# Patient Record
Sex: FEMALE | Race: BLACK OR AFRICAN AMERICAN | Employment: UNEMPLOYED | URBAN - METROPOLITAN AREA
[De-identification: names, ages, dates, MRNs, and addresses within clinical notes are randomized per-mention and may not be internally consistent; named-entity substitution may affect disease eponyms.]

---

## 2018-12-19 ENCOUNTER — HOSPITAL ENCOUNTER (EMERGENCY)
Age: 38
Discharge: HOME | End: 2018-12-19
Attending: EMERGENCY MEDICINE
Payer: MEDICAID

## 2018-12-19 VITALS
HEART RATE: 106 BPM | TEMPERATURE: 98.4 F | SYSTOLIC BLOOD PRESSURE: 128 MMHG | BODY MASS INDEX: 44.42 KG/M2 | WEIGHT: 283 LBS | DIASTOLIC BLOOD PRESSURE: 85 MMHG | RESPIRATION RATE: 20 BRPM | HEIGHT: 67 IN | OXYGEN SATURATION: 97 %

## 2018-12-19 DIAGNOSIS — F32.A DEPRESSION, UNSPECIFIED DEPRESSION TYPE: Primary | ICD-10-CM

## 2018-12-19 PROCEDURE — 99284 EMERGENCY DEPT VISIT MOD MDM: CPT | Performed by: EMERGENCY MEDICINE

## 2018-12-19 PROCEDURE — 99283 EMERGENCY DEPT VISIT LOW MDM: CPT | Performed by: EMERGENCY MEDICINE
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
## 2018-12-19 NOTE — PROGRESS NOTES
Spoke with patient about [facility]. She stated before she did that she would like to call her cousin to see if she could possibly stay with her. She also needed a  to charge her phone to get numbers. DAVID provided her with a . Patient expressed concerns of getting her medicine out of the home. She declined to call police for an escort to the home to retrieve her things. After failed tries with family members to come stay with them she agreed to call [facility] for a phone screening. Patient states that she called them earlier. She stated that they had no bed available. DAVID called back and spoke with [facility]. They stated they had no record of patient doing a screening. Patient started to become more frustrated and decline to finish a new screening and proceeded to leave the ED.     DAVID discussed with MD.
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
## 2023-08-17 ENCOUNTER — APPOINTMENT (OUTPATIENT)
Dept: CT IMAGING | Age: 43
End: 2023-08-17
Payer: MEDICAID

## 2023-08-17 ENCOUNTER — HOSPITAL ENCOUNTER (INPATIENT)
Age: 43
LOS: 2 days | Discharge: HOME | End: 2023-08-19
Attending: STUDENT IN AN ORGANIZED HEALTH CARE EDUCATION/TRAINING PROGRAM | Admitting: STUDENT IN AN ORGANIZED HEALTH CARE EDUCATION/TRAINING PROGRAM
Payer: COMMERCIAL

## 2023-08-17 ENCOUNTER — HOSPITAL ENCOUNTER (EMERGENCY)
Age: 43
Discharge: ANOTHER HOSPITAL | End: 2023-08-17
Attending: STUDENT IN AN ORGANIZED HEALTH CARE EDUCATION/TRAINING PROGRAM
Payer: MEDICAID

## 2023-08-17 VITALS
HEART RATE: 76 BPM | TEMPERATURE: 98.4 F | SYSTOLIC BLOOD PRESSURE: 132 MMHG | BODY MASS INDEX: 45.99 KG/M2 | DIASTOLIC BLOOD PRESSURE: 83 MMHG | RESPIRATION RATE: 26 BRPM | HEIGHT: 67 IN | OXYGEN SATURATION: 98 % | WEIGHT: 293 LBS

## 2023-08-17 DIAGNOSIS — R20.9 DISTURBANCE OF SKIN SENSATION: Primary | ICD-10-CM

## 2023-08-17 DIAGNOSIS — R51.9 ACUTE NONINTRACTABLE HEADACHE, UNSPECIFIED HEADACHE TYPE: ICD-10-CM

## 2023-08-17 DIAGNOSIS — G43.101 MIGRAINE WITH AURA AND WITH STATUS MIGRAINOSUS, NOT INTRACTABLE: Primary | ICD-10-CM

## 2023-08-17 PROBLEM — I63.9 ISCHEMIC STROKE (HCC): Status: ACTIVE | Noted: 2023-08-17

## 2023-08-17 LAB
ALBUMIN SERPL-MCNC: 4 G/DL (ref 3.5–5)
ALBUMIN/GLOB SERPL: 1.3 (ref 0.4–1.6)
ALP SERPL-CCNC: 94 U/L (ref 45–117)
ALT SERPL-CCNC: 17 U/L (ref 13–61)
ANION GAP SERPL CALC-SCNC: 9 MMOL/L (ref 2–11)
APPEARANCE UR: ABNORMAL
AST SERPL-CCNC: 15 U/L (ref 15–37)
BACTERIA URNS QL MICRO: ABNORMAL /HPF
BASOPHILS # BLD: 0.1 K/UL (ref 0–0.2)
BASOPHILS NFR BLD: 1 % (ref 0–2)
BILIRUB SERPL-MCNC: 0.2 MG/DL (ref 0.2–1.1)
BILIRUB UR QL: NEGATIVE
BUN SERPL-MCNC: 13 MG/DL (ref 6–23)
CALCIUM SERPL-MCNC: 9.1 MG/DL (ref 8.3–10.4)
CASTS URNS QL MICRO: 0 /LPF
CHLORIDE SERPL-SCNC: 105 MMOL/L (ref 98–107)
CHP ED QC CHECK: YES
CO2 SERPL-SCNC: 27 MMOL/L (ref 21–32)
COLOR UR: ABNORMAL
CREAT SERPL-MCNC: 0.87 MG/DL (ref 0.6–1)
CRYSTALS URNS QL MICRO: 0 /LPF
DIFFERENTIAL METHOD BLD: NORMAL
EOSINOPHIL # BLD: 0.2 K/UL (ref 0–0.8)
EOSINOPHIL NFR BLD: 2 % (ref 0.5–7.8)
EPI CELLS #/AREA URNS HPF: ABNORMAL /HPF
ERYTHROCYTE [DISTWIDTH] IN BLOOD BY AUTOMATED COUNT: 12.8 % (ref 11.9–14.6)
GLOBULIN SER CALC-MCNC: 3 G/DL (ref 2.8–4.5)
GLUCOSE BLD-MCNC: 90 MG/DL
GLUCOSE SERPL-MCNC: 86 MG/DL (ref 65–100)
GLUCOSE UR STRIP.AUTO-MCNC: NEGATIVE MG/DL
HCG UR QL: NEGATIVE
HCT VFR BLD AUTO: 41.5 % (ref 35.8–46.3)
HGB BLD-MCNC: 13.7 G/DL (ref 11.7–15.4)
HGB UR QL STRIP: ABNORMAL
IMM GRANULOCYTES # BLD AUTO: 0 K/UL (ref 0–0.5)
IMM GRANULOCYTES NFR BLD AUTO: 0 % (ref 0–5)
INR PPP: 1
KETONES UR QL STRIP.AUTO: NEGATIVE MG/DL
LEUKOCYTE ESTERASE UR QL STRIP.AUTO: ABNORMAL
LYMPHOCYTES # BLD: 2.9 K/UL (ref 0.5–4.6)
LYMPHOCYTES NFR BLD: 41 % (ref 13–44)
MCH RBC QN AUTO: 28.8 PG (ref 26.1–32.9)
MCHC RBC AUTO-ENTMCNC: 33 G/DL (ref 31.4–35)
MCV RBC AUTO: 87.2 FL (ref 82–102)
MONOCYTES # BLD: 0.5 K/UL (ref 0.1–1.3)
MONOCYTES NFR BLD: 7 % (ref 4–12)
MUCOUS THREADS URNS QL MICRO: 0 /LPF
NEUTS SEG # BLD: 3.4 K/UL (ref 1.7–8.2)
NEUTS SEG NFR BLD: 48 % (ref 43–78)
NITRITE UR QL STRIP.AUTO: NEGATIVE
NRBC # BLD: 0 K/UL (ref 0–0.2)
OTHER OBSERVATIONS: ABNORMAL
PH UR STRIP: 5.5 (ref 5–9)
PLATELET # BLD AUTO: 198 K/UL (ref 150–450)
PMV BLD AUTO: 11.8 FL (ref 9.4–12.3)
POTASSIUM SERPL-SCNC: 4.4 MMOL/L (ref 3.5–5.1)
PROT SERPL-MCNC: 7 G/DL (ref 6.4–8.2)
PROT UR STRIP-MCNC: 100 MG/DL
PROTHROMBIN TIME: 13.3 SEC (ref 12.6–14.3)
RBC # BLD AUTO: 4.76 M/UL (ref 4.05–5.2)
RBC #/AREA URNS HPF: ABNORMAL /HPF
SODIUM SERPL-SCNC: 141 MMOL/L (ref 133–143)
SP GR UR REFRACTOMETRY: 1.01 (ref 1–1.02)
UROBILINOGEN UR QL STRIP.AUTO: 0.2 EU/DL (ref 0.2–1)
WBC # BLD AUTO: 7 K/UL (ref 4.3–11.1)
WBC URNS QL MICRO: ABNORMAL /HPF

## 2023-08-17 PROCEDURE — 85025 COMPLETE CBC W/AUTO DIFF WBC: CPT

## 2023-08-17 PROCEDURE — 6370000000 HC RX 637 (ALT 250 FOR IP): Performed by: STUDENT IN AN ORGANIZED HEALTH CARE EDUCATION/TRAINING PROGRAM

## 2023-08-17 PROCEDURE — 6360000004 HC RX CONTRAST MEDICATION: Performed by: STUDENT IN AN ORGANIZED HEALTH CARE EDUCATION/TRAINING PROGRAM

## 2023-08-17 PROCEDURE — 99285 EMERGENCY DEPT VISIT HI MDM: CPT

## 2023-08-17 PROCEDURE — 6360000002 HC RX W HCPCS: Performed by: STUDENT IN AN ORGANIZED HEALTH CARE EDUCATION/TRAINING PROGRAM

## 2023-08-17 PROCEDURE — 96374 THER/PROPH/DIAG INJ IV PUSH: CPT

## 2023-08-17 PROCEDURE — 81001 URINALYSIS AUTO W/SCOPE: CPT

## 2023-08-17 PROCEDURE — 70498 CT ANGIOGRAPHY NECK: CPT

## 2023-08-17 PROCEDURE — 1100000003 HC PRIVATE W/ TELEMETRY

## 2023-08-17 PROCEDURE — 85610 PROTHROMBIN TIME: CPT

## 2023-08-17 PROCEDURE — 70450 CT HEAD/BRAIN W/O DYE: CPT

## 2023-08-17 PROCEDURE — 82962 GLUCOSE BLOOD TEST: CPT

## 2023-08-17 PROCEDURE — 6370000000 HC RX 637 (ALT 250 FOR IP): Performed by: HOSPITALIST

## 2023-08-17 PROCEDURE — 80053 COMPREHEN METABOLIC PANEL: CPT

## 2023-08-17 PROCEDURE — 2580000003 HC RX 258: Performed by: STUDENT IN AN ORGANIZED HEALTH CARE EDUCATION/TRAINING PROGRAM

## 2023-08-17 PROCEDURE — 81025 URINE PREGNANCY TEST: CPT

## 2023-08-17 RX ORDER — CARBAMAZEPINE 200 MG/1
200 TABLET ORAL ONCE
Status: COMPLETED | OUTPATIENT
Start: 2023-08-17 | End: 2023-08-17

## 2023-08-17 RX ORDER — FLUTICASONE PROPIONATE 50 MCG
1 SPRAY, SUSPENSION (ML) NASAL 2 TIMES DAILY
COMMUNITY
Start: 2023-04-12

## 2023-08-17 RX ORDER — METHOCARBAMOL 500 MG/1
500 TABLET, FILM COATED ORAL 4 TIMES DAILY PRN
COMMUNITY
Start: 2023-04-12

## 2023-08-17 RX ORDER — CETIRIZINE HYDROCHLORIDE 10 MG/1
10 TABLET ORAL DAILY
COMMUNITY
Start: 2023-07-26

## 2023-08-17 RX ORDER — 0.9 % SODIUM CHLORIDE 0.9 %
100 INTRAVENOUS SOLUTION INTRAVENOUS ONCE
Status: COMPLETED | OUTPATIENT
Start: 2023-08-17 | End: 2023-08-17

## 2023-08-17 RX ORDER — POLYETHYLENE GLYCOL 3350 17 G/17G
17 POWDER, FOR SOLUTION ORAL DAILY PRN
Status: DISCONTINUED | OUTPATIENT
Start: 2023-08-17 | End: 2023-08-19 | Stop reason: HOSPADM

## 2023-08-17 RX ORDER — ESTRADIOL 1 MG/1
1 TABLET ORAL DAILY
COMMUNITY
Start: 2023-04-12

## 2023-08-17 RX ORDER — METOCLOPRAMIDE HYDROCHLORIDE 5 MG/ML
10 INJECTION INTRAMUSCULAR; INTRAVENOUS ONCE
Status: COMPLETED | OUTPATIENT
Start: 2023-08-17 | End: 2023-08-17

## 2023-08-17 RX ORDER — ACETAMINOPHEN 325 MG/1
650 TABLET ORAL
Status: COMPLETED | OUTPATIENT
Start: 2023-08-17 | End: 2023-08-17

## 2023-08-17 RX ORDER — SODIUM CHLORIDE 0.9 % (FLUSH) 0.9 %
5-40 SYRINGE (ML) INJECTION 2 TIMES DAILY
Status: DISCONTINUED | OUTPATIENT
Start: 2023-08-17 | End: 2023-08-17 | Stop reason: HOSPADM

## 2023-08-17 RX ORDER — CARBAMAZEPINE 200 MG/1
400 TABLET ORAL
Status: DISCONTINUED | OUTPATIENT
Start: 2023-08-18 | End: 2023-08-19 | Stop reason: HOSPADM

## 2023-08-17 RX ORDER — SENNA AND DOCUSATE SODIUM 50; 8.6 MG/1; MG/1
2 TABLET, FILM COATED ORAL 2 TIMES DAILY
Status: DISCONTINUED | OUTPATIENT
Start: 2023-08-18 | End: 2023-08-19 | Stop reason: HOSPADM

## 2023-08-17 RX ORDER — SODIUM CHLORIDE 0.9 % (FLUSH) 0.9 %
5-40 SYRINGE (ML) INJECTION PRN
Status: DISCONTINUED | OUTPATIENT
Start: 2023-08-17 | End: 2023-08-19 | Stop reason: HOSPADM

## 2023-08-17 RX ORDER — ASPIRIN 81 MG/1
81 TABLET, CHEWABLE ORAL DAILY
Status: DISCONTINUED | OUTPATIENT
Start: 2023-08-18 | End: 2023-08-19 | Stop reason: HOSPADM

## 2023-08-17 RX ORDER — LATANOPROST 50 UG/ML
1 SOLUTION/ DROPS OPHTHALMIC DAILY
Status: DISCONTINUED | OUTPATIENT
Start: 2023-08-17 | End: 2023-08-19 | Stop reason: HOSPADM

## 2023-08-17 RX ORDER — SODIUM CHLORIDE 9 MG/ML
INJECTION, SOLUTION INTRAVENOUS PRN
Status: DISCONTINUED | OUTPATIENT
Start: 2023-08-17 | End: 2023-08-19 | Stop reason: HOSPADM

## 2023-08-17 RX ORDER — FLUTICASONE PROPIONATE 50 MCG
1 SPRAY, SUSPENSION (ML) NASAL DAILY
Status: DISCONTINUED | OUTPATIENT
Start: 2023-08-18 | End: 2023-08-19 | Stop reason: HOSPADM

## 2023-08-17 RX ORDER — ENOXAPARIN SODIUM 100 MG/ML
30 INJECTION SUBCUTANEOUS EVERY 12 HOURS
Status: DISCONTINUED | OUTPATIENT
Start: 2023-08-17 | End: 2023-08-19 | Stop reason: HOSPADM

## 2023-08-17 RX ORDER — ONDANSETRON 4 MG/1
4 TABLET, ORALLY DISINTEGRATING ORAL EVERY 8 HOURS PRN
Status: DISCONTINUED | OUTPATIENT
Start: 2023-08-17 | End: 2023-08-19 | Stop reason: HOSPADM

## 2023-08-17 RX ORDER — LISINOPRIL 40 MG/1
40 TABLET ORAL DAILY
COMMUNITY
Start: 2023-07-26

## 2023-08-17 RX ORDER — AMOXICILLIN 250 MG
4 CAPSULE ORAL 2 TIMES DAILY
COMMUNITY
Start: 2023-04-12

## 2023-08-17 RX ORDER — ASPIRIN 300 MG/1
300 SUPPOSITORY RECTAL DAILY
Status: DISCONTINUED | OUTPATIENT
Start: 2023-08-18 | End: 2023-08-19 | Stop reason: HOSPADM

## 2023-08-17 RX ORDER — ONDANSETRON 2 MG/ML
4 INJECTION INTRAMUSCULAR; INTRAVENOUS EVERY 6 HOURS PRN
Status: DISCONTINUED | OUTPATIENT
Start: 2023-08-17 | End: 2023-08-19 | Stop reason: HOSPADM

## 2023-08-17 RX ORDER — LATANOPROST 50 UG/ML
1 SOLUTION/ DROPS OPHTHALMIC
COMMUNITY
Start: 2023-04-12

## 2023-08-17 RX ORDER — CITALOPRAM 20 MG/1
10 TABLET ORAL DAILY
Status: DISCONTINUED | OUTPATIENT
Start: 2023-08-18 | End: 2023-08-19 | Stop reason: HOSPADM

## 2023-08-17 RX ORDER — ATORVASTATIN CALCIUM 80 MG/1
80 TABLET, FILM COATED ORAL NIGHTLY
Status: DISCONTINUED | OUTPATIENT
Start: 2023-08-17 | End: 2023-08-19 | Stop reason: HOSPADM

## 2023-08-17 RX ORDER — LEVOCETIRIZINE DIHYDROCHLORIDE 5 MG/1
5 TABLET, FILM COATED ORAL DAILY
COMMUNITY
Start: 2023-07-26

## 2023-08-17 RX ORDER — NYSTATIN 100000 [USP'U]/G
POWDER TOPICAL
COMMUNITY
Start: 2023-07-26 | End: 2024-07-25

## 2023-08-17 RX ORDER — DICYCLOMINE HCL 20 MG
20 TABLET ORAL 4 TIMES DAILY PRN
Status: DISCONTINUED | OUTPATIENT
Start: 2023-08-17 | End: 2023-08-19 | Stop reason: HOSPADM

## 2023-08-17 RX ORDER — CARBAMAZEPINE 200 MG/1
TABLET ORAL
COMMUNITY
Start: 2023-08-13

## 2023-08-17 RX ORDER — CARBAMAZEPINE 200 MG/1
200 TABLET ORAL 2 TIMES DAILY
Status: DISCONTINUED | OUTPATIENT
Start: 2023-08-17 | End: 2023-08-17

## 2023-08-17 RX ORDER — DICYCLOMINE HCL 20 MG
TABLET ORAL
COMMUNITY
Start: 2023-05-17

## 2023-08-17 RX ORDER — CITALOPRAM HYDROBROMIDE 10 MG/1
10 TABLET ORAL DAILY
COMMUNITY
Start: 2023-04-12

## 2023-08-17 RX ORDER — CETIRIZINE HYDROCHLORIDE 10 MG/1
10 TABLET ORAL DAILY
Status: DISCONTINUED | OUTPATIENT
Start: 2023-08-18 | End: 2023-08-19 | Stop reason: HOSPADM

## 2023-08-17 RX ORDER — SODIUM CHLORIDE 0.9 % (FLUSH) 0.9 %
5-40 SYRINGE (ML) INJECTION EVERY 12 HOURS SCHEDULED
Status: DISCONTINUED | OUTPATIENT
Start: 2023-08-17 | End: 2023-08-19 | Stop reason: HOSPADM

## 2023-08-17 RX ADMIN — CARBAMAZEPINE 200 MG: 200 TABLET ORAL at 22:29

## 2023-08-17 RX ADMIN — LATANOPROST 1 DROP: 50 SOLUTION OPHTHALMIC at 22:34

## 2023-08-17 RX ADMIN — ATORVASTATIN CALCIUM 80 MG: 80 TABLET, FILM COATED ORAL at 21:16

## 2023-08-17 RX ADMIN — IOPAMIDOL 60 ML: 755 INJECTION, SOLUTION INTRAVENOUS at 14:38

## 2023-08-17 RX ADMIN — POLYETHYLENE GLYCOL 3350 17 G: 17 POWDER, FOR SOLUTION ORAL at 21:19

## 2023-08-17 RX ADMIN — SODIUM CHLORIDE, PRESERVATIVE FREE 10 ML: 5 INJECTION INTRAVENOUS at 14:39

## 2023-08-17 RX ADMIN — ENOXAPARIN SODIUM 30 MG: 100 INJECTION SUBCUTANEOUS at 21:19

## 2023-08-17 RX ADMIN — ACETAMINOPHEN 650 MG: 325 TABLET ORAL at 16:05

## 2023-08-17 RX ADMIN — CARBAMAZEPINE 200 MG: 200 TABLET ORAL at 21:16

## 2023-08-17 RX ADMIN — METOCLOPRAMIDE HYDROCHLORIDE 10 MG: 5 INJECTION INTRAMUSCULAR; INTRAVENOUS at 16:05

## 2023-08-17 RX ADMIN — SODIUM CHLORIDE 100 ML: 9 INJECTION, SOLUTION INTRAVENOUS at 14:38

## 2023-08-17 RX ADMIN — SODIUM CHLORIDE, PRESERVATIVE FREE 10 ML: 5 INJECTION INTRAVENOUS at 21:20

## 2023-08-17 ASSESSMENT — PAIN SCALES - GENERAL
PAINLEVEL_OUTOF10: 0
PAINLEVEL_OUTOF10: 10
PAINLEVEL_OUTOF10: 8

## 2023-08-17 ASSESSMENT — PAIN DESCRIPTION - LOCATION: LOCATION: HEAD

## 2023-08-17 ASSESSMENT — LIFESTYLE VARIABLES
HOW MANY STANDARD DRINKS CONTAINING ALCOHOL DO YOU HAVE ON A TYPICAL DAY: PATIENT DOES NOT DRINK
HOW OFTEN DO YOU HAVE A DRINK CONTAINING ALCOHOL: NEVER

## 2023-08-17 ASSESSMENT — PAIN - FUNCTIONAL ASSESSMENT: PAIN_FUNCTIONAL_ASSESSMENT: 0-10

## 2023-08-17 NOTE — H&P
Hospitalist History and Physical   Admit Date:  2023  6:09 PM   Name:  Lalita Leung   Age:  41 y.o. Sex:  female  :  1980   MRN:  [id number]   Room:  [facility]/    Presenting/Chief Complaint: No chief complaint on file. Reason(s) for Admission: Ischemic stroke ([address]) [I63.9]     History of Present Illness:     Amrit Bernstein is a 41 y.o. female with medical history of hypertension, seizure disorder, IBS who presented with right-sided tingling and numbness since this morning 9:30-10 AM.  Patient woke up this morning feeling numbness and tingling involving the right upper and lower extremity. Initially she was unable to hold a cup on the right hand but denies weakness, change in speech/vision, ataxia, vertigo, chest pain, shortness of breath, convulsions. She also reported moderate headache for the past few days and left-sided sharp nonradiating posterior neck pain started this morning. [address] [facility] ED, noncontrast head CT showed: A large, likely postinfarct chronic cystic encephalomalacia in the right cerebral hemisphere in the superior distribution of the MCA. CTA head/neck showed: Very large right sided old infarct cannot totally this may represent an arachnoid cyst, with significant narrowing of M2 segments of the right middle cerebral artery. Neurology consulted and patient transferred to [facility] for further work-up. Assessment & Plan:    Amrit Bernstein is a 41 y.o. female with medical history of hypertension, seizure disorder, IBS who presented with right-sided tingling and numbness since this morning 9:30-10 AM.     Principal Problem:    Ischemic stroke [facility]  Patient presenting with right-sided tingling and numbness, headache and left-sided posterior neck pain. CT showed: A large, likely postinfarct chronic cystic encephalomalacia in the right cerebral hemisphere in the superior distribution of the MCA.  CTA head/neck showed: Very large right sided old 3.5 - 5.0 g/dL    Globulin 3.0 2.8 - 4.5 g/dL    Albumin/Globulin Ratio 1.3 0.4 - 1.6     Protime-INR    Collection Time: 08/17/23  1:41 PM   Result Value Ref Range    Protime 13.3 12.6 - 14.3 sec    INR 1.0     Urinalysis w rflx microscopic    Collection Time: 08/17/23  2:37 PM   Result Value Ref Range    Color, UA STRAW      Appearance TURBID      Specific Gravity, UA 1.010 1.001 - 1.023      pH, Urine 5.5 5.0 - 9.0      Protein,  (A) NEG mg/dL    Glucose, UA Negative mg/dL    Ketones, Urine Negative NEG mg/dL    Bilirubin Urine Negative NEG      Blood, Urine Trace Intact (A) NEG      Urobilinogen, Urine 0.2 0.2 - 1.0 EU/dL    Nitrite, Urine Negative NEG      Leukocyte Esterase, Urine TRACE (A) NEG     Pregnancy, Urine    Collection Time: 08/17/23  2:37 PM   Result Value Ref Range    HCG(Urine) Pregnancy Test Negative     Urinalysis, Micro    Collection Time: 08/17/23  2:37 PM   Result Value Ref Range    WBC, UA 3-5 0 /hpf    RBC, UA 0-3 0 /hpf    Epithelial Cells UA 20-50 0 /hpf    BACTERIA, URINE 2+ (H) 0 /hpf    Casts 0 0 /lpf    Crystals 0 0 /LPF    Mucus, UA 0 0 /lpf    Other observations RESULTS VERIFIED MANUALLY         I have personally reviewed imaging studies:  CTA HEAD NECK W WO CONTRAST    Result Date: 8/17/2023  History: Sided tingling and numbness. History of CVA Comments: CT ANGIOGRAM OF THE NECK AND CT ANGIOGRAM OF THE [place] OF [place] was obtained following the administration of IV contrast. IV contrast was administered to evaluate the arterial vasculature. Reformatted images in the coronal and sagittal planes as well as 3-D imaging was obtained and reviewed on a dedicated PACS and [address] [place], [place] [address]Suite [address]. Radiation reduction dose techniques were used for the study. Our CT scanner use one or all of the following- Automated exposure control, adjustment of the mA and/or KV according the patient size, iterative reconstruction. All measurements are based upon NASCET criteria if appropriate.

## 2023-08-17 NOTE — ED PROVIDER NOTES
MCA  distribution. No mass effect from this. The rest of the structures in the right cerebral hemisphere. Intact. The midline and parasellar structures, and posterior fossa are otherwise intact. There is no finding of an acute cortical stroke, mass lesion, or acute  intracranial blood. No extra-axial fluid collection. The skull is intact, no significant abnormality. The visualized paranasal sinuses are patent. The visualized orbits and mastoid air-cells are not remarkable. Impression    THERE IS A LARGE LIKELY POST INFARCT CHRONIC CYSTIC ENCEPHALOMALACIA FOCUS IN  THE RIGHT CEREBRAL HEMISPHERE IN THE SUPERIOR DISTRIBUTION OF THE MCA. DETAILS  AS ABOVE. OTHERWISE NO ACUTE INTRACRANIAL ABNORMALITY IDENTIFIED. CTA HEAD NECK W WO CONTRAST    Narrative    History: Sided tingling and numbness. History of CVA    Comments: CT ANGIOGRAM OF THE NECK AND CT ANGIOGRAM OF THE [place] OF [place] was  obtained following the administration of IV contrast. IV contrast was  administered to evaluate the arterial vasculature. Reformatted images in the  coronal and sagittal planes as well as 3-D imaging was obtained and reviewed on  a dedicated PACS and [facility],[address]. Radiation reduction dose techniques  were used for the study. Our CT scanner use one or all of the following-  Automated exposure control, adjustment of the mA and/or KV according the patient  size, iterative reconstruction. All measurements are based upon NASCET criteria  if appropriate. Findings:    CT ANGIOGRAM OF THE NECK: The arch and proximal great vessels are patent. The  carotid bulbs are patent without stenosis. The proximal vertebral arteries are  patent. Lung apices are clear. Thyroid gland is unremarkable    CT angiogram of [place] of [place]:    The petrous, cavernous, and supraclinoid internal carotid arteries are patent.   The middle cerebral arteries are patent however a proximal M2 segment on the  left demonstrates significant narrowing and there is sequelae of a large  right-sided infarct. The distal vertebral arteries, posterior inferior cerebellar arteries basilar  artery and posterior cerebral arteries are patent. The dural venous sinuses are patent. Impression    1. Very large right sided old infarct cannot totally this may represent an  arachnoid cyst, with significant narrowing of M2 segments of the right middle  cerebral artery.        CBC with Auto Differential   Result Value Ref Range    WBC 7.0 4.3 - 11.1 K/uL    RBC 4.76 4.05 - 5.20 M/uL    Hemoglobin 13.7 11.7 - 15.4 g/dL    Hematocrit 41.5 35.8 - 46.3 %    MCV 87.2 82.0 - 102.0 FL    MCH 28.8 26.1 - 32.9 PG    MCHC 33.0 31.4 - 35.0 g/dL    RDW 12.8 11.9 - 14.6 %    Platelets 353 947 - 125 K/uL    MPV 11.8 9.4 - 12.3 FL    nRBC 0.00 0.0 - 0.2 K/uL    Differential Type AUTOMATED      Neutrophils % 48 43 - 78 %    Lymphocytes % 41 13 - 44 %    Monocytes % 7 4.0 - 12.0 %    Eosinophils % 2 0.5 - 7.8 %    Basophils % 1 0.0 - 2.0 %    Immature Granulocytes 0 0.0 - 5.0 %    Neutrophils Absolute 3.4 1.7 - 8.2 K/UL    Lymphocytes Absolute 2.9 0.5 - 4.6 K/UL    Monocytes Absolute 0.5 0.1 - 1.3 K/UL    Eosinophils Absolute 0.2 0.0 - 0.8 K/UL    Basophils Absolute 0.1 0.0 - 0.2 K/UL    Absolute Immature Granulocyte 0.0 0.0 - 0.5 K/UL   CMP   Result Value Ref Range    Sodium 141 133 - 143 mmol/L    Potassium 4.4 3.5 - 5.1 mmol/L    Chloride 105 98 - 107 mmol/L    CO2 27 21 - 32 mmol/L    Anion Gap 9 2 - 11 mmol/L    Glucose 86 65 - 100 mg/dL    BUN 13 6 - 23 MG/DL    Creatinine 0.87 0.6 - 1.0 MG/DL    Est, Glom Filt Rate >60 >60 ml/min/1.73m2    Calcium 9.1 8.3 - 10.4 MG/DL    Total Bilirubin 0.2 0.2 - 1.1 MG/DL    ALT 17 13.0 - 61.0 U/L    AST 15 15 - 37 U/L    Alk Phosphatase 94 45.0 - 117.0 U/L    Total Protein 7.0 6.4 - 8.2 g/dL    Albumin 4.0 3.5 - 5.0 g/dL    Globulin 3.0 2.8 - 4.5 g/dL    Albumin/Globulin Ratio 1.3 0.4 - 1.6     Protime-INR   Result Value Ref

## 2023-08-17 NOTE — ED TRIAGE NOTES
Pt arrived by EMS from home. Pt c/o headache and constipation x4 days, also right arm tingling started this morning. Hx stroke, states it happened when she was younger - does have some left sided deficits.      VSS  150/100  90bpm  100% RA  BGL 90

## 2023-08-17 NOTE — ED NOTES
TRANSFER - OUT REPORT:    Verbal report given to [place], RN on Christina Chi  being transferred to [facility] [phone] for routine progression of patient care       Report consisted of patient's Situation, Background, Assessment and   Recommendations(SBAR). Information from the following report(s) Nurse Handoff Report, ED Encounter Summary, MAR, Cardiac Rhythm NSR, and Neuro Assessment was reviewed with the receiving nurse. Lines:   Peripheral IV 08/17/23 Right Antecubital (Active)   Site Assessment Clean, dry & intact 08/17/23 1332   Line Status Blood return noted 08/17/23 1332   Phlebitis Assessment No symptoms 08/17/23 1332   Infiltration Assessment 0 08/17/23 1332   Dressing Type Transparent 08/17/23 1332   Dressing Intervention New 08/17/23 1332        Opportunity for questions and clarification was provided.       Patient transported with:  Monitor       Raquel Harkins RN  08/17/23 0808

## 2023-08-18 ENCOUNTER — HOSPITAL ENCOUNTER (INPATIENT)
Age: 43
End: 2023-08-18
Payer: MEDICAID

## 2023-08-18 LAB
CHOLEST SERPL-MCNC: 182 MG/DL
ERYTHROCYTE [DISTWIDTH] IN BLOOD BY AUTOMATED COUNT: 13.1 % (ref 11.9–14.6)
EST. AVERAGE GLUCOSE BLD GHB EST-MCNC: 88 MG/DL
GLUCOSE BLD STRIP.AUTO-MCNC: 90 MG/DL (ref 65–100)
HBA1C MFR BLD: 4.7 % (ref 4.8–5.6)
HCT VFR BLD AUTO: 39.5 % (ref 35.8–46.3)
HDLC SERPL-MCNC: 53 MG/DL (ref 40–60)
HDLC SERPL: 3.4
HGB BLD-MCNC: 12.8 G/DL (ref 11.7–15.4)
LDLC SERPL CALC-MCNC: 92 MG/DL
MCH RBC QN AUTO: 28.8 PG (ref 26.1–32.9)
MCHC RBC AUTO-ENTMCNC: 32.4 G/DL (ref 31.4–35)
MCV RBC AUTO: 88.8 FL (ref 82–102)
NRBC # BLD: 0 K/UL (ref 0–0.2)
PLATELET # BLD AUTO: 190 K/UL (ref 150–450)
PMV BLD AUTO: 12 FL (ref 9.4–12.3)
RBC # BLD AUTO: 4.45 M/UL (ref 4.05–5.2)
SERVICE CMNT-IMP: NORMAL
TRIGL SERPL-MCNC: 185 MG/DL (ref 35–150)
VLDLC SERPL CALC-MCNC: 37 MG/DL (ref 6–23)
WBC # BLD AUTO: 7.3 K/UL (ref 4.3–11.1)

## 2023-08-18 PROCEDURE — 80061 LIPID PANEL: CPT

## 2023-08-18 PROCEDURE — 6370000000 HC RX 637 (ALT 250 FOR IP): Performed by: HOSPITALIST

## 2023-08-18 PROCEDURE — 2580000003 HC RX 258: Performed by: STUDENT IN AN ORGANIZED HEALTH CARE EDUCATION/TRAINING PROGRAM

## 2023-08-18 PROCEDURE — 97166 OT EVAL MOD COMPLEX 45 MIN: CPT

## 2023-08-18 PROCEDURE — 2580000003 HC RX 258: Performed by: FAMILY MEDICINE

## 2023-08-18 PROCEDURE — A9579 GAD-BASE MR CONTRAST NOS,1ML: HCPCS | Performed by: FAMILY MEDICINE

## 2023-08-18 PROCEDURE — 36415 COLL VENOUS BLD VENIPUNCTURE: CPT

## 2023-08-18 PROCEDURE — 92610 EVALUATE SWALLOWING FUNCTION: CPT

## 2023-08-18 PROCEDURE — 1100000003 HC PRIVATE W/ TELEMETRY

## 2023-08-18 PROCEDURE — 6360000002 HC RX W HCPCS: Performed by: STUDENT IN AN ORGANIZED HEALTH CARE EDUCATION/TRAINING PROGRAM

## 2023-08-18 PROCEDURE — 99221 1ST HOSP IP/OBS SF/LOW 40: CPT | Performed by: PSYCHIATRY & NEUROLOGY

## 2023-08-18 PROCEDURE — 97530 THERAPEUTIC ACTIVITIES: CPT

## 2023-08-18 PROCEDURE — 6370000000 HC RX 637 (ALT 250 FOR IP): Performed by: INTERNAL MEDICINE

## 2023-08-18 PROCEDURE — 6370000000 HC RX 637 (ALT 250 FOR IP): Performed by: STUDENT IN AN ORGANIZED HEALTH CARE EDUCATION/TRAINING PROGRAM

## 2023-08-18 PROCEDURE — 97112 NEUROMUSCULAR REEDUCATION: CPT

## 2023-08-18 PROCEDURE — 70553 MRI BRAIN STEM W/O & W/DYE: CPT

## 2023-08-18 PROCEDURE — 97162 PT EVAL MOD COMPLEX 30 MIN: CPT

## 2023-08-18 PROCEDURE — 85027 COMPLETE CBC AUTOMATED: CPT

## 2023-08-18 PROCEDURE — 6360000004 HC RX CONTRAST MEDICATION: Performed by: FAMILY MEDICINE

## 2023-08-18 PROCEDURE — 83036 HEMOGLOBIN GLYCOSYLATED A1C: CPT

## 2023-08-18 RX ORDER — METOCLOPRAMIDE HYDROCHLORIDE 5 MG/ML
10 INJECTION INTRAMUSCULAR; INTRAVENOUS ONCE
Status: DISCONTINUED | OUTPATIENT
Start: 2023-08-18 | End: 2023-08-19 | Stop reason: HOSPADM

## 2023-08-18 RX ORDER — DIPHENHYDRAMINE HYDROCHLORIDE 50 MG/ML
25 INJECTION INTRAMUSCULAR; INTRAVENOUS ONCE
Status: DISCONTINUED | OUTPATIENT
Start: 2023-08-18 | End: 2023-08-19 | Stop reason: HOSPADM

## 2023-08-18 RX ORDER — SODIUM CHLORIDE 0.9 % (FLUSH) 0.9 %
10 SYRINGE (ML) INJECTION AS NEEDED
Status: DISCONTINUED | OUTPATIENT
Start: 2023-08-18 | End: 2023-08-21 | Stop reason: HOSPADM

## 2023-08-18 RX ORDER — LORAZEPAM 0.5 MG/1
1 TABLET ORAL ONCE
Status: COMPLETED | OUTPATIENT
Start: 2023-08-18 | End: 2023-08-18

## 2023-08-18 RX ORDER — ACETAMINOPHEN 500 MG
1000 TABLET ORAL ONCE
Status: DISCONTINUED | OUTPATIENT
Start: 2023-08-18 | End: 2023-08-19 | Stop reason: HOSPADM

## 2023-08-18 RX ORDER — KETOROLAC TROMETHAMINE 30 MG/ML
30 INJECTION, SOLUTION INTRAMUSCULAR; INTRAVENOUS ONCE
Status: COMPLETED | OUTPATIENT
Start: 2023-08-18 | End: 2023-08-18

## 2023-08-18 RX ADMIN — CETIRIZINE HYDROCHLORIDE 10 MG: 10 TABLET, FILM COATED ORAL at 08:27

## 2023-08-18 RX ADMIN — KETOROLAC TROMETHAMINE 30 MG: 30 INJECTION, SOLUTION INTRAMUSCULAR at 00:36

## 2023-08-18 RX ADMIN — DOCUSATE SODIUM 50 MG AND SENNOSIDES 8.6 MG 2 TABLET: 8.6; 5 TABLET, FILM COATED ORAL at 20:19

## 2023-08-18 RX ADMIN — LATANOPROST 1 DROP: 50 SOLUTION OPHTHALMIC at 08:28

## 2023-08-18 RX ADMIN — SODIUM CHLORIDE, PRESERVATIVE FREE 10 ML: 5 INJECTION INTRAVENOUS at 08:28

## 2023-08-18 RX ADMIN — DOCUSATE SODIUM 50 MG AND SENNOSIDES 8.6 MG 2 TABLET: 8.6; 5 TABLET, FILM COATED ORAL at 08:27

## 2023-08-18 RX ADMIN — ATORVASTATIN CALCIUM 80 MG: 80 TABLET, FILM COATED ORAL at 20:19

## 2023-08-18 RX ADMIN — ENOXAPARIN SODIUM 30 MG: 100 INJECTION SUBCUTANEOUS at 20:19

## 2023-08-18 RX ADMIN — FLUTICASONE PROPIONATE 1 SPRAY: 50 SPRAY, METERED NASAL at 08:28

## 2023-08-18 RX ADMIN — GADOTERIDOL 28 ML: 279.3 INJECTION, SOLUTION INTRAVENOUS at 18:42

## 2023-08-18 RX ADMIN — CARBAMAZEPINE 400 MG: 200 TABLET ORAL at 20:20

## 2023-08-18 RX ADMIN — ENOXAPARIN SODIUM 30 MG: 100 INJECTION SUBCUTANEOUS at 08:28

## 2023-08-18 RX ADMIN — CITALOPRAM HYDROBROMIDE 10 MG: 20 TABLET ORAL at 08:27

## 2023-08-18 RX ADMIN — SODIUM CHLORIDE, PRESERVATIVE FREE 10 ML: 5 INJECTION INTRAVENOUS at 18:42

## 2023-08-18 RX ADMIN — LORAZEPAM 1 MG: 0.5 TABLET ORAL at 16:32

## 2023-08-18 RX ADMIN — SODIUM CHLORIDE, PRESERVATIVE FREE 5 ML: 5 INJECTION INTRAVENOUS at 20:22

## 2023-08-18 RX ADMIN — ASPIRIN 81 MG 81 MG: 81 TABLET ORAL at 19:43

## 2023-08-18 ASSESSMENT — PAIN SCALES - GENERAL: PAINLEVEL_OUTOF10: 0

## 2023-08-18 NOTE — PROGRESS NOTES
ACUTE PHYSICAL THERAPY GOALS:   (Developed with and agreed upon by patient and/or caregiver.)  Pt will perform supine to/from sit with mod I in 7 treatment days. Pt will perform sit to/from stand with mod I and LRAD in 7 treatment days. Pt will ambulate 150 ft with mod I and LRAD in 7 treatment days. Pt will negotiate 2 stairs with mod I and no rail in 7 treatment days. Pt will be independent with HEP in 7 days. PHYSICAL THERAPY Initial Assessment, Daily Note, and AM  (Link to Caseload Tracking: PT Visit Days : 1  Acknowledge Orders  Time In/Out  PT Charge Capture  Rehab Caseload Tracker    Doreen Baker is a 41 y.o. female   PRIMARY DIAGNOSIS: Ischemic stroke ([address])  Ischemic stroke ([address]) [I63.9]       Reason for Referral: Generalized Muscle Weakness (M62.81)  Other lack of cordination (R27.8)  Difficulty in walking, Not elsewhere classified (R26.2)  Unspecified Lack of Coordination (R27.9)  Inpatient: Payor: [employer] [place] / Plan: [employer] HEALTH PLAN PRIMARY CHOICE / Product Type: *No Product type* /     ASSESSMENT:     REHAB RECOMMENDATIONS:   Recommendation to date pending progress:  Setting:  Inpatient Rehab Facility    Equipment:    To Be Determined     ASSESSMENT:  Ms. Charlette Vargas is a 41 y.o. female with hx of L sided CVA admitted with R sided parasthesia and weakness. Upon PT evaluation, pt exhibits RLE/RUE weakness/diminished sensation, impaired balance, and difficulty ambulating due to RLE buckling. Pt also reporting neck pain with reproduction of R sided tingling with Spurling's Compression Test.  At baseline, pt is independent with all mobility and receives assist for some ADLs from her boyfriend. Pt is now requiring SBA for transfers and min A to ambulate due to LOB/buckling. Pt is highly motivated to return to independent PLOF and will make an excellent IRC candidate.   Pt will continue to benefit from skilled PT to address above impairments and maximize functional independence prior

## 2023-08-18 NOTE — THERAPY EVALUATION
ACUTE OCCUPATIONAL THERAPY GOALS:   (Developed with and agreed upon by patient and/or caregiver.)  1. Patient will complete lower body bathing and dressing with STAND BY ASSIST and adaptive equipment as needed. 2. Patient will complete toileting with STAND BY ASSIST. 3. Patient will complete grooming ADL standing at sink with SUPERVISION. 4. Patient will tolerate 25 minutes of OT treatment with 1-2 rest breaks to increase activity tolerance for ADLs. 5. Patient will complete functional transfers with MODIFIED INDEPENDENCE and adaptive equipment as needed. 6. Patient will tolerate 10 minutes BUE exercises to increase strength for safe, functional transfers. Timeframe: 7 visits       OCCUPATIONAL THERAPY Initial Assessment, Daily Note, and AM       OT Visit Days: 1  Acknowledge Orders  Time  OT Charge Capture  Rehab Caseload Tracker      Christina Chi is a 41 y.o. female   PRIMARY DIAGNOSIS: Ischemic stroke ([address])  Ischemic stroke ([address]) [I63.9]       Reason for Referral: Generalized Muscle Weakness (M62.81)  Other lack of cordination (R27.8)  Difficulty in walking, Not elsewhere classified (R26.2)  Inpatient: Payor: [employer] [place] / Plan: [employer] HEALTH PLAN PRIMARY CHOICE / Product Type: *No Product type* /     ASSESSMENT:     REHAB RECOMMENDATIONS:   Recommendation to date pending progress:  Setting:  Inpatient Rehab Facility     Equipment:    To Be Determined     ASSESSMENT:  Ms. Drea Wolf is a 40 y/o female presents with R-sided weakness and sensation impairments, has a history of CVA in childhood with L-sided deficits, longstanding LUE motor impairment. At baseline pt reports being independent in most ADL/ADL mobility but requiring assist from supportive boyfriend for bathing d/t body habitus and LUE motor impairment. Today, pt demonstrates impairments in sensation, strength, balance, ADL performance and functional mobility.  During functional mobility using RW, pt fatigued quickly and training, standing tolerance activity , and sitting balance activity   with minimal assistance, verbal cues, tactile cues, visual cues, and education to improve sitting balance, standing balance, posture, coordination, static balance, and dynamic balance in order to prepare for functional task, prepare for seated ADLs, prepare for standing ADLs, prepare for functional transfer, and increase safety awareness.      TREATMENT GRID:  N/A    AFTER TREATMENT PRECAUTIONS: Bed/Chair Locked, Call light within reach, Chair, Heels floated, Needs within reach, and RN notified    INTERDISCIPLINARY COLLABORATION:  RN/ PCT, PT/ PTA, and OT/ VOGEL    EDUCATION:  Education Given To: Patient  Education Provided: Role of Therapy;Plan of Care  Education Outcome: Verbalized understanding    TOTAL TREATMENT DURATION AND TIME:  Time In: 1009  Time Out: 0317 [id number]  Minutes: [address], OT

## 2023-08-18 NOTE — PROGRESS NOTES
SPEECH LANGUAGE PATHOLOGY: DYSPHAGIA  Initial Assessment    NAME: La Christensen  : 1980  MRN: [id number]    ADMISSION DATE: 2023  PRIMARY DIAGNOSIS: Ischemic stroke ([address])  Ischemic stroke ([address]) [I63.9]    ICD-10: Treatment Diagnosis: R13.19 Other Dysphagia    RECOMMENDATIONS   Diet:  Diet Solids Recommendation: Regular  Liquid Consistency Recommendation: Thin    Medications: PO             Referrals: n/a   Patient continues to require skilled intervention: no dysphagia needs. Possible higher level cognitive linguistic evaluation pending MRI findings. ASSESSMENT    Dysphagia Diagnosis: Swallow function appears WFL    Recommend regular diet and thin liquids. No dysphagia treatment indicated. GENERAL    History of Present Injury/Illness: Ms. Leighann Pino  has no past medical history on file. . She also  has no past surgical history on file. Subjective: awake in bed, conversant and pleasant. \"I hope i'm home by Saturday\"      Communication Observation: Functional (good recall of events)  Follows Directions: Complex    Prior Dysphagia History: none   Prior instrumental assessment: n/a    Current Diet : Regular  Current Liquid Diet : Thin  Patient Complaint: right arm/leg numbness. Pain:   Patient does not c/o pain                                          OBJECTIVE        Oral Motor   Labial:  (slightly reduced retraction on left.)  Oral Hygiene: Clean;Moist  Lingual: No impairment  Dentition: Adequate        Baseline Vocal Quality: Normal    Oropharyngeal Phase:      Patient self fed thin liquid via cup and straw, mixed, and solid consistencies. Appropriate oral prep and clearance with all textures. Appeared to demonstrate timely swallow and single swallows upon palpation likely indicating adequate pharyngeal clearance. No overt signs or symptoms of airway compromise observed with liquid or solid textures. Voice remained clear.                      Dysphagia Outcome and

## 2023-08-18 NOTE — CARE COORDINATION
Chart reviewed by  for potential discharge needs or concerns. Therapy evals complete with the recommendation for [place]patient Rehab at [place]. CM met with pt to complete CM assessment and discuss this recommendation. Insurance and PCP confirmed. Pt is insured with pharmacy benefits and medications are affordable. She is established with a PCP and saw them within the last month. Pt declined rehab at [place].  offered [facility] services (RN/PT/OT) and pt declined these as well. Pt verbalized understanding that should she change her mind after dc that she can contact her PCP for a referral.  No other dc needs or concerns identified or reported at present. Please notify/consult  if discharge needs arise. 08/18/23 1944   Service Assessment   Patient Orientation Alert and Oriented   Cognition Alert   History Provided By Patient;Medical Record   Primary Caregiver Spouse   Support Systems Spouse/Significant Other   Patient's Healthcare Decision Maker is: Legal Next of Kin   PCP Verified by CM Yes  ([facility])   Last Visit to PCP Within last 3 months   Prior Functional Level Assistance with the following:;Bathing;Dressing   Current Functional Level Assistance with the following:;Bathing;Dressing   Can patient return to prior living arrangement Yes   Ability to make needs known: Good   Family able to assist with home care needs: Yes   Would you like for me to discuss the discharge plan with any other family members/significant others, and if so, who? No   Financial Resources  Resources None   Social/Functional History   Lives With Significant other   Type of [facility] [address] Two level; Able to Live on Main level with bedroom/bathroom   Home Access Stairs to enter without rails   Entrance Stairs - Number of Steps 2   Receives Help From Family   ADL Assistance Needs assistance   Homemaking Assistance Needs assistance   Ambulation Assistance Independent

## 2023-08-18 NOTE — PROGRESS NOTES
4.05 - 5.2 M/uL    Hemoglobin 12.8 11.7 - 15.4 g/dL    Hematocrit 39.5 35.8 - 46.3 %    MCV 88.8 82 - 102 FL    MCH 28.8 26.1 - 32.9 PG    MCHC 32.4 31.4 - 35.0 g/dL    RDW 13.1 11.9 - 14.6 %    Platelets 772 224 - 098 K/uL    MPV 12.0 9.4 - 12.3 FL    nRBC 0.00 0.0 - 0.2 K/uL   Hemoglobin A1c    Collection Time: 08/18/23  4:32 AM   Result Value Ref Range    Hemoglobin A1C 4.7 (L) 4.8 - 5.6 %    eAG 88 mg/dL   Lipid Panel    Collection Time: 08/18/23  4:32 AM   Result Value Ref Range    Cholesterol, Total 182 <200 MG/DL    Triglycerides 185 (H) 35 - 150 MG/DL    HDL 53 40 - 60 MG/DL    LDL Calculated 92 <100 MG/DL    VLDL Cholesterol Calculated 37 (H) 6.0 - 23.0 MG/DL    Chol/HDL Ratio 3.4         Current Meds:  Current Facility-Administered Medications   Medication Dose Route Frequency    cetirizine (ZYRTEC) tablet 10 mg  10 mg Oral Daily    citalopram (CELEXA) tablet 10 mg  10 mg Oral Daily    dicyclomine (BENTYL) tablet 20 mg  20 mg Oral 4x Daily PRN    latanoprost (XALATAN) 0.005 % ophthalmic solution 1 drop  1 drop Both Eyes Daily    sodium chloride flush 0.9 % injection 5-40 mL  5-40 mL IntraVENous 2 times per day    sodium chloride flush 0.9 % injection 5-40 mL  5-40 mL IntraVENous PRN    0.9 % sodium chloride infusion   IntraVENous PRN    ondansetron (ZOFRAN-ODT) disintegrating tablet 4 mg  4 mg Oral Q8H PRN    Or    ondansetron (ZOFRAN) injection 4 mg  4 mg IntraVENous Q6H PRN    polyethylene glycol (GLYCOLAX) packet 17 g  17 g Oral Daily PRN    enoxaparin Sodium (LOVENOX) injection 30 mg  30 mg SubCUTAneous Q12H    atorvastatin (LIPITOR) tablet 80 mg  80 mg Oral Nightly    aspirin chewable tablet 81 mg  81 mg Oral Daily    Or    aspirin suppository 300 mg  300 mg Rectal Daily    fluticasone (FLONASE) 50 MCG/ACT nasal spray 1 spray  1 spray Each Nostril Daily    sennosides-docusate sodium (SENOKOT-S) 8.6-50 MG tablet 2 tablet  2 tablet Oral BID    carBAMazepine (TEGRETOL) tablet 400 mg  400 mg Oral QHS Signed:  Leyla Moore MD    Part of this note may have been written by using a voice dictation software. The note has been proof read but may still contain some grammatical/other typographical errors.

## 2023-08-18 NOTE — CONSULTS
Consult    Patient: Jamil Crump MRN: [id number]     YOB: 1980  Age: 41 y.o. Sex: female      Subjective:      Jamil Crump is a 41 y.o. female who is being seen for right-sided sensory abnormalities. The patient states that she had a stroke at 7 months of age which affected her right hemisphere. The patient has been having migraine headaches which are associated with right-sided paresthesias.       Past medical history: As above    Past surgical history: None    Family history: Reviewed and not pertinent          Social History     Tobacco Use    Smoking status: Some Days     Packs/day: 0.50     Types: Cigarettes     Start date: 1/1/1998    Smokeless tobacco: Never   Substance Use Topics    Alcohol use: Not Currently      Current Facility-Administered Medications   Medication Dose Route Frequency Provider Last Rate Last Admin    cetirizine (ZYRTEC) tablet 10 mg  10 mg Oral Daily Charles Chan MD   10 mg at 08/18/23 0827    citalopram (CELEXA) tablet 10 mg  10 mg Oral Daily Charles Chan MD   10 mg at 08/18/23 0827    dicyclomine (BENTYL) tablet 20 mg  20 mg Oral 4x Daily PRN Charles Chan MD        latanoprost (XALATAN) 0.005 % ophthalmic solution 1 drop  1 drop Both Eyes Daily Charles Chan MD   1 drop at 08/18/23 0828    sodium chloride flush 0.9 % injection 5-40 mL  5-40 mL IntraVENous 2 times per day Charles Chan MD   10 mL at 08/18/23 0828    sodium chloride flush 0.9 % injection 5-40 mL  5-40 mL IntraVENous PRN Charles Chan MD        0.9 % sodium chloride infusion   IntraVENous PRN Charles Chan MD        ondansetron (ZOFRAN-ODT) disintegrating tablet 4 mg  4 mg Oral Q8H PRN Charles Chan MD        Or    ondansetron (ZOFRAN) injection 4 mg  4 mg IntraVENous Q6H PRN Charles Chan MD        polyethylene glycol (GLYCOLAX) packet 17 g  17 g Oral Daily PRN Charles Chan MD   17 g at 08/17/23 2119    enoxaparin Sodium (LOVENOX) injection 30 mg  30 mg found for: HBA1C, IHV4XQRS     CT Results (most recent): Personally Reviewed   Results for orders placed during the hospital encounter of 08/17/23    CT HEAD WO CONTRAST    Narrative  CT BRAIN WITHOUT CONTRAST  8/17/2023 3:18 PM    INDICATION: Right arm, tingling and numbness    COMPARISON: 1/9/2008 CT brain    TECHNIQUE:  Multiple contiguous axial images are obtained encompassing the brain  from the skull base to the vertex. For this CT scanner at least one of the  following techniques is utilized to decrease patient radiation exposure:  Automatic exposure control, modulation of MA and KVP based on patient weight,  and iterative reconstruction. FINDINGS: Ventricles are midline. There is ex vacuo dilatation of the right  lateral ventricle. There is a large region of cystic encephalomalacia change in the right cerebral  hemisphere involving probably most of the superior aspect of the MCA  distribution. No mass effect from this. The rest of the structures in the right cerebral hemisphere. Intact. The midline and parasellar structures, and posterior fossa are otherwise intact. There is no finding of an acute cortical stroke, mass lesion, or acute  intracranial blood. No extra-axial fluid collection. The skull is intact, no significant abnormality. The visualized paranasal sinuses are patent. The visualized orbits and mastoid air-cells are not remarkable. Impression  THERE IS A LARGE LIKELY POST INFARCT CHRONIC CYSTIC ENCEPHALOMALACIA FOCUS IN  THE RIGHT CEREBRAL HEMISPHERE IN THE SUPERIOR DISTRIBUTION OF THE MCA. DETAILS  AS ABOVE. OTHERWISE NO ACUTE INTRACRANIAL ABNORMALITY IDENTIFIED. Most recent CTA Personally Reviewed  Results for orders placed during the hospital encounter of 08/17/23    CTA HEAD NECK W WO CONTRAST    Narrative  History: Sided tingling and numbness.  History of CVA    Comments: CT ANGIOGRAM OF THE NECK AND CT ANGIOGRAM OF THE [place] [facility] was  obtained

## 2023-08-19 VITALS
BODY MASS INDEX: 45.99 KG/M2 | TEMPERATURE: 98.6 F | RESPIRATION RATE: 18 BRPM | DIASTOLIC BLOOD PRESSURE: 99 MMHG | SYSTOLIC BLOOD PRESSURE: 132 MMHG | HEIGHT: 67 IN | WEIGHT: 293 LBS | OXYGEN SATURATION: 96 % | HEART RATE: 93 BPM

## 2023-08-19 PROBLEM — G43.909 MIGRAINE: Status: ACTIVE | Noted: 2023-08-19

## 2023-08-19 PROCEDURE — 6370000000 HC RX 637 (ALT 250 FOR IP): Performed by: STUDENT IN AN ORGANIZED HEALTH CARE EDUCATION/TRAINING PROGRAM

## 2023-08-19 PROCEDURE — 2580000003 HC RX 258: Performed by: STUDENT IN AN ORGANIZED HEALTH CARE EDUCATION/TRAINING PROGRAM

## 2023-08-19 PROCEDURE — 99231 SBSQ HOSP IP/OBS SF/LOW 25: CPT | Performed by: PSYCHIATRY & NEUROLOGY

## 2023-08-19 PROCEDURE — 6370000000 HC RX 637 (ALT 250 FOR IP): Performed by: HOSPITALIST

## 2023-08-19 PROCEDURE — 6360000002 HC RX W HCPCS: Performed by: STUDENT IN AN ORGANIZED HEALTH CARE EDUCATION/TRAINING PROGRAM

## 2023-08-19 RX ADMIN — CITALOPRAM HYDROBROMIDE 10 MG: 20 TABLET ORAL at 10:28

## 2023-08-19 RX ADMIN — DOCUSATE SODIUM 50 MG AND SENNOSIDES 8.6 MG 2 TABLET: 8.6; 5 TABLET, FILM COATED ORAL at 10:28

## 2023-08-19 RX ADMIN — SODIUM CHLORIDE, PRESERVATIVE FREE 10 ML: 5 INJECTION INTRAVENOUS at 10:30

## 2023-08-19 RX ADMIN — ENOXAPARIN SODIUM 30 MG: 100 INJECTION SUBCUTANEOUS at 10:28

## 2023-08-19 RX ADMIN — ASPIRIN 81 MG 81 MG: 81 TABLET ORAL at 10:28

## 2023-08-19 RX ADMIN — CETIRIZINE HYDROCHLORIDE 10 MG: 10 TABLET, FILM COATED ORAL at 10:28

## 2023-08-19 ASSESSMENT — PAIN SCALES - GENERAL: PAINLEVEL_OUTOF10: 0

## 2023-08-19 NOTE — PROGRESS NOTES
Neurology Progress Note       Interval History: The patient is doing well today[facility] and she feels great today. She is ready to go home      Examination: Pertinent positives and negatives include:    No focal neurological deficits on examination other than chronic abnormalities such as left upper extremity weakness. I personally reviewed the patient's MRI which shows large areas of cystic encephalomalacia appearing similar to arachnoid cysts. No acute intracranial pathology      Assessment and Plan: 72-year-old with focal deficits secondary to migraine. She is normal after Nurtec    Recommend following up with Klaudia Mueller. We can give the patient a sample of Nurtec at that time. Triptans are contraindicated given her history of stroke. Addendum: If the patient was not on a statin and aspirin prior to hospitalization then this is probably not necessary. Her abnormalities are from infancy. Cumulative time spent today was 25 minutes which included chart review, examining the patient, obtaining history from patient/family/other providers, reviewing imaging, and counseling the patient and/or family on medical condition.

## 2023-08-19 NOTE — PLAN OF CARE
Problem: Discharge Planning  Goal: Discharge to home or other facility with appropriate resources  Outcome: Progressing  Flowsheets (Taken 8/18/2023 2000)  Discharge to home or other facility with appropriate resources: Identify barriers to discharge with patient and caregiver     Problem: ABCDS Injury Assessment  Goal: Absence of physical injury  Outcome: Progressing  Flowsheets (Taken 8/18/2023 1942)  Absence of Physical Injury: Implement safety measures based on patient assessment     Problem: Safety - Adult  Goal: Free from fall injury  Outcome: Progressing  Flowsheets (Taken 8/18/2023 1942)  Free From Fall Injury:   Instruct family/caregiver on patient safety   Based on caregiver fall risk screen, instruct family/caregiver to ask for assistance with transferring infant if caregiver noted to have fall risk factors     Problem: Pain  Goal: Verbalizes/displays adequate comfort level or baseline comfort level  Outcome: Progressing

## 2023-08-19 NOTE — PROGRESS NOTES
received request for prayer and request for meal trays for . Patient expressed stress over her health issues, and that her condition is partially caused by stress.  also expressed concerns for his wife and stated he has not eaten since they have been here and did not have transportation to get food at home.  provided pastoral presence, prayer and empathetic listening. Meal trays were also approved to be delivered for .   Signed by  Jourdan Valente M.Div.

## 2023-08-19 NOTE — PROGRESS NOTES
RN set up transport with [employer] [id number]. Per transport services, transport can take anywhere from 1-4 hours depending on how busy they are. Reference #[id number].

## 2023-08-19 NOTE — DISCHARGE SUMMARY
Hospitalist Discharge Summary   Admit Date:  2023  6:09 PM   DC Note date: 2023  Name:  Luda Guerra   Age:  41 y.o. Sex:  female  :  1980   MRN:  [id number]   Room:    PCP:  Jorge Santillan MD    Presenting Complaint: No chief complaint on file. Initial Admission Diagnosis: Ischemic stroke ([address]) [I63.9]     Problem List for this Hospitalization (present on admission):    Principal Problem:    Migraine  Resolved Problems:    * No resolved hospital problems. *      Hospital Course:  41 y.o. female with medical history of hypertension, seizure disorder, IBS who presented with right-sided tingling and numbness     1. Paresthesias in the setting of complex migraine  CT brain without intracranial bleeding  CTA of head and neck as below  MRI of the brain without acute infarction  Neurology consulted  Started on nurtec  Resolved symptomatology  Hemodynamically stable on discharge  Outpatient follow-up      Disposition: Home  Diet: ADULT DIET; Regular  Code Status: Full Code    Follow Ups:   Follow-up Information     Jorge Santillan MD Follow up. Specialty: Family Medicine  Contact information:  [id number] for [address] [id number]  [address], APRN. Call. Specialty: Neurology  Why: Call  to make follow-up appointment. Contact information:  [address]  [address]  [phone]                       Time spent in patient discharge and coordination 31 minutes. Follow up labs/diagnostics (ultimately defer to outpatient provider):  None    Plan was discussed with patient. All questions answered. Patient was stable at time of discharge. Instructions given to call a physician or return if any concerns.     Current Discharge Medication List        CONTINUE these medications which have NOT CHANGED    Details   carBAMazepine (TEGRETOL) 200 MG tablet       cetirizine (ZYRTEC) 10 MG ANIONGAP  --  9   BUN  --  13   CREATININE  --  0.87   LABGLOM  --  >60   CALCIUM  --  9.1   GLUCOSE 90 86      CBC @LABRCNT(WBC:3,RBC:3,HGB:3,HCT:3,MCV:3,MCH:3,MCHC:3,RDW:3,PLT:3,MPV:3,NRBC:3,SEGS:3,LYMPHOPCT:3,EOSRELPCT:3,MONOPCT:3,BASOPCT:3,IMMGRAN:3,SEGSABS:3,LYMPHSABS:3,EOSABS:3,MONOSABS:3,BASOSABS:3,ABSIMMGRAN:3)@   LFT Recent Labs     08/17/23  1341   BILITOT 0.2   ALKPHOS 94   AST 15   ALT 17   PROT 7.0   LABALBU 4.0   GLOB 3.0      Cardiac  No results found for: NTPROBNP, TROPHS   Coags Lab Results   Component Value Date/Time    PROTIME 13.3 08/17/2023 01:41 PM    INR 1.0 08/17/2023 01:41 PM      A1c Lab Results   Component Value Date/Time    LABA1C 4.7 08/18/2023 04:32 AM    EAG 88 08/18/2023 04:32 AM      Lipids Lab Results   Component Value Date/Time    CHOL 182 08/18/2023 04:32 AM    LDLCALC 92 08/18/2023 04:32 AM    LABVLDL 37 08/18/2023 04:32 AM    HDL 53 08/18/2023 04:32 AM    CHOLHDLRATIO 3.4 08/18/2023 04:32 AM    TRIG 185 08/18/2023 04:32 AM      Thyroid  No results found for: Bobbi New     Most Recent UA Lab Results   Component Value Date/Time    COLORU STRAW 08/17/2023 02:37 PM    APPEARANCE TURBID 08/17/2023 02:37 PM    SPECGRAV 1.010 08/17/2023 02:37 PM    LABPH 5.5 08/17/2023 02:37 PM    PROTEINU 100 08/17/2023 02:37 PM    GLUCOSEU Negative 08/17/2023 02:37 PM    KETUA Negative 08/17/2023 02:37 PM    BILIRUBINUR Negative 08/17/2023 02:37 PM    BLOODU Trace Intact 08/17/2023 02:37 PM    UROBILINOGEN 0.2 08/17/2023 02:37 PM    NITRU Negative 08/17/2023 02:37 PM    LEUKOCYTESUR TRACE 08/17/2023 02:37 PM    WBCUA 3-5 08/17/2023 02:37 PM    RBCUA 0-3 08/17/2023 02:37 PM    EPITHUA 20-50 08/17/2023 02:37 PM    BACTERIA 2+ 08/17/2023 02:37 PM    LABCAST 0 08/17/2023 02:37 PM    MUCUS 0 08/17/2023 02:37 PM        Microbiology:  Results       ** No results found for the last 336 hours.  **            All Labs from Last 24 Hrs:  No results found for this or any previous visit (from the past 24

## 2023-08-22 ENCOUNTER — TELEPHONE (OUTPATIENT)
Dept: NEUROLOGY | Age: 43
End: 2023-08-22

## 2023-08-22 NOTE — TELEPHONE ENCOUNTER
Care Transitions Initial Follow Up Call    Outreach made within 2 business days of discharge: Yes    Patient: Mary Beth Alonso Patient : 1980   MRN: [id number]  Reason for Admission: There are no discharge diagnoses documented for the most recent discharge. Discharge Date: 23       Spoke with: pt    Discharge department/facility: [facility]    TCM Interactive Patient Contact:  Was patient able to fill all prescriptions: No: could not fill due to cost of [address]  Was patient instructed to bring all medications to the follow-up visit: Yes  Is patient taking all medications as directed in the discharge summary?  Yes  Does patient understand their discharge instructions: Yes  Does patient have questions or concerns that need addressed prior to 7-14 day follow up office visit: no    Scheduled appointment with PCP within 7-14 days    Follow Up  Future Appointments   Date Time Provider [address] [place] [address]   2023  2:00 PM ELAN Benjamin BSND [facility] AMB       Art Diaz MA

## 2025-01-10 ENCOUNTER — HOSPITAL ENCOUNTER (EMERGENCY)
Age: 45
Discharge: HOME | End: 2025-01-10
Attending: EMERGENCY MEDICINE
Payer: MEDICAID

## 2025-01-10 VITALS
BODY MASS INDEX: 44.2 KG/M2 | HEART RATE: 99 BPM | SYSTOLIC BLOOD PRESSURE: 130 MMHG | RESPIRATION RATE: 15 BRPM | TEMPERATURE: 98.5 F | HEIGHT: 66 IN | WEIGHT: 275 LBS | DIASTOLIC BLOOD PRESSURE: 105 MMHG | OXYGEN SATURATION: 97 %

## 2025-01-10 DIAGNOSIS — G43.001 MIGRAINE WITHOUT AURA AND WITH STATUS MIGRAINOSUS, NOT INTRACTABLE: Primary | ICD-10-CM

## 2025-01-10 DIAGNOSIS — M25.561 ARTHRALGIA OF RIGHT KNEE: ICD-10-CM

## 2025-01-10 PROCEDURE — 96375 TX/PRO/DX INJ NEW DRUG ADDON: CPT

## 2025-01-10 PROCEDURE — 96374 THER/PROPH/DIAG INJ IV PUSH: CPT

## 2025-01-10 PROCEDURE — 2580000003 HC RX 258: Performed by: EMERGENCY MEDICINE

## 2025-01-10 PROCEDURE — 99284 EMERGENCY DEPT VISIT MOD MDM: CPT

## 2025-01-10 PROCEDURE — 6360000002 HC RX W HCPCS: Performed by: EMERGENCY MEDICINE

## 2025-01-10 RX ORDER — DIPHENHYDRAMINE HYDROCHLORIDE 50 MG/ML
12.5 INJECTION INTRAMUSCULAR; INTRAVENOUS
Status: COMPLETED | OUTPATIENT
Start: 2025-01-10 | End: 2025-01-10

## 2025-01-10 RX ORDER — 0.9 % SODIUM CHLORIDE 0.9 %
1000 INTRAVENOUS SOLUTION INTRAVENOUS ONCE
Status: COMPLETED | OUTPATIENT
Start: 2025-01-10 | End: 2025-01-10

## 2025-01-10 RX ORDER — BUTALBITAL, ACETAMINOPHEN AND CAFFEINE 300; 40; 50 MG/1; MG/1; MG/1
1 CAPSULE ORAL EVERY 4 HOURS PRN
Qty: 20 CAPSULE | Refills: 0 | Status: SHIPPED | OUTPATIENT
Start: 2025-01-10

## 2025-01-10 RX ORDER — PROCHLORPERAZINE EDISYLATE 5 MG/ML
10 INJECTION INTRAMUSCULAR; INTRAVENOUS
Status: COMPLETED | OUTPATIENT
Start: 2025-01-10 | End: 2025-01-10

## 2025-01-10 RX ORDER — KETOROLAC TROMETHAMINE 30 MG/ML
30 INJECTION, SOLUTION INTRAMUSCULAR; INTRAVENOUS
Status: COMPLETED | OUTPATIENT
Start: 2025-01-10 | End: 2025-01-10

## 2025-01-10 RX ORDER — DICLOFENAC SODIUM 75 MG/1
75 TABLET, DELAYED RELEASE ORAL 2 TIMES DAILY
Qty: 20 TABLET | Refills: 0 | Status: SHIPPED | OUTPATIENT
Start: 2025-01-10

## 2025-01-10 RX ADMIN — SODIUM CHLORIDE 1000 ML: 9 INJECTION, SOLUTION INTRAVENOUS at 03:37

## 2025-01-10 RX ADMIN — PROCHLORPERAZINE EDISYLATE 10 MG: 5 INJECTION INTRAMUSCULAR; INTRAVENOUS at 03:34

## 2025-01-10 RX ADMIN — KETOROLAC TROMETHAMINE 30 MG: 30 INJECTION, SOLUTION INTRAMUSCULAR at 03:34

## 2025-01-10 RX ADMIN — DIPHENHYDRAMINE HYDROCHLORIDE 12.5 MG: 50 INJECTION INTRAMUSCULAR; INTRAVENOUS at 03:34

## 2025-01-10 ASSESSMENT — PAIN SCALES - GENERAL
PAINLEVEL_OUTOF10: 10
PAINLEVEL_OUTOF10: 3

## 2025-01-10 ASSESSMENT — LIFESTYLE VARIABLES
HOW OFTEN DO YOU HAVE A DRINK CONTAINING ALCOHOL: NEVER
HOW MANY STANDARD DRINKS CONTAINING ALCOHOL DO YOU HAVE ON A TYPICAL DAY: PATIENT DOES NOT DRINK

## 2025-01-10 ASSESSMENT — PAIN - FUNCTIONAL ASSESSMENT: PAIN_FUNCTIONAL_ASSESSMENT: 0-10

## 2025-01-10 NOTE — ED PROVIDER NOTES
Emergency Department Provider Note       PCP: Tulio Aldana MD   Age: 44 y.o.   Sex: female     DISPOSITION Decision To Discharge 01/10/2025 04:20:17 AM    ICD-10-CM    1. Migraine without aura and with status migrainosus, not intractable  G43.001       2. Arthralgia of right knee  M25.561           Medical Decision Making     Will treat for migraine abortive therapy.  Patient reports significant improvement in symptoms after treatment for migraine.  Stable for discharge.     1 acute complicated illness or injury.  Prescription drug management performed.  Shared medical decision making was utilized in creating the patients health plan today.  I independently ordered and reviewed each unique test.                         History     Patient with past medical history significant for migraines as well as arthritis, seizure disorder and PTSD presents complaining of severe headache as well as right leg pain.  She states that headache began about 15 hours ago, 11 AM when she woke up.  That has been unresolved despite treatment at home.  She states that is similar to previous headaches with no new or unusual symptoms although it is more intense than usual currently rating the pain 10/10.  She also complains of pain to the proximal right shin but denies any injury.  She has a history of arthritis to the right knee.  The pain in the shin began at the same time as the headache.  She denies any paresthesias or other neurological symptoms associated with the headache.  She denies any fever or chills, nasal congestion or upper respiratory infection symptoms.  She denies nausea or vomiting.  She was admitted to [facility] about a year ago with strokelike symptoms and determined to have a complex migraine with aura.  MRI was negative for acute CVA    The history is provided by the patient and medical records.     Physical Exam     Vitals signs and nursing note reviewed:  Vitals:    01/10/25 0249 01/10/25 0252   BP: (S) (!)

## 2025-01-10 NOTE — ED TRIAGE NOTES
Pt brought in by EMS from home with c/o intermittent headaches that started this morning. Pt has taken aspirin and tylenol without relief. Pt stated that she has a hx of migraines. Pt also reported right leg pain that started this morning. Pt stated, \" I placed a heating pad on it, but it didn't do anything. Nothing gives me relief.\" Pt reported having a hx of arthritis in right leg.